# Patient Record
Sex: FEMALE | Race: BLACK OR AFRICAN AMERICAN | NOT HISPANIC OR LATINO | URBAN - NONMETROPOLITAN AREA
[De-identification: names, ages, dates, MRNs, and addresses within clinical notes are randomized per-mention and may not be internally consistent; named-entity substitution may affect disease eponyms.]

---

## 2018-02-26 PROBLEM — H16.223: Noted: 2018-02-26

## 2018-02-26 PROBLEM — E11.9: Noted: 2018-02-26

## 2018-02-26 PROBLEM — H40.1231: Noted: 2019-11-25

## 2018-02-26 PROBLEM — H25.813: Noted: 2018-02-26

## 2019-04-10 ENCOUNTER — IMPORTED ENCOUNTER (OUTPATIENT)
Dept: URBAN - NONMETROPOLITAN AREA CLINIC 1 | Facility: CLINIC | Age: 64
End: 2019-04-10

## 2019-04-10 PROBLEM — H25.813: Noted: 2018-02-26

## 2019-04-10 PROBLEM — E11.9: Noted: 2018-02-26

## 2019-04-10 PROBLEM — H40.233: Noted: 2019-04-10

## 2019-04-10 PROBLEM — H16.223: Noted: 2018-02-26

## 2019-04-10 PROCEDURE — 99213 OFFICE O/P EST LOW 20 MIN: CPT

## 2019-04-10 PROCEDURE — 92020 GONIOSCOPY: CPT

## 2019-04-10 NOTE — PATIENT DISCUSSION
Intermittent Angle Closure Glaucoma -  Discussed findings of exam in detail with the patient. -  Discussed the risk of permanent vision loss and blindness associated with glaucoma. -  Discussed the need for lifelong monitoring to detect signs of glaucoma.-Discussed the chronic progressive nature of this disease and various treatment options.-Importance of good compliance with medications was emphasized. - IOP's today were 10 OU.- Cont. Combigan OU BID.- Cont. Latanoprost OU QHS. -Continue current gtt regimen for now. - S/P repeat SLT OD 1/5/17.-Considering synechial angle closure I would speculate angle closure mechanism rather than LTG. Will obtain VF and discuss/recommend LPI OU and lens removal over the next few visits as patients gains understanding of her glaucoma mechanism. -Order HVF 24-2 next visitRTC: 4 mos f/u & HVF 24-2DM s -Stressed the importance of keeping blood sugars under control blood pressure under control and weight normalization and regular visits with PCP. -Explained the possible effects of poorly controlled diabetes and the damage that diabetes can cause to ocular health. -Patient to check HgbA1C.-Pt instructed to contact our office with any vision changes. KEVON-  Discussed findings of exam in detail with the patient. -  Discussed the chronic nature and treatment options with the patient. -  Continue Systane Ultra QID/prn. Recommend using more frequently and on a regular basis. Cataract OU-Reviewed symptoms of advancing cataract growth such as glare and halos and decreased vision.-Continue to monitor for now.  Pt will notify us if any new symptoms develop.; Dr's Notes: VF- 8/13/18[facility] OCT - 2/28/18

## 2019-10-23 ENCOUNTER — IMPORTED ENCOUNTER (OUTPATIENT)
Dept: URBAN - NONMETROPOLITAN AREA CLINIC 1 | Facility: CLINIC | Age: 64
End: 2019-10-23

## 2019-10-23 PROCEDURE — 92083 EXTENDED VISUAL FIELD XM: CPT

## 2019-11-25 ENCOUNTER — IMPORTED ENCOUNTER (OUTPATIENT)
Dept: URBAN - NONMETROPOLITAN AREA CLINIC 1 | Facility: CLINIC | Age: 64
End: 2019-11-25

## 2019-11-25 PROCEDURE — 92012 INTRM OPH EXAM EST PATIENT: CPT

## 2020-06-15 ENCOUNTER — IMPORTED ENCOUNTER (OUTPATIENT)
Dept: URBAN - NONMETROPOLITAN AREA CLINIC 1 | Facility: CLINIC | Age: 65
End: 2020-06-15

## 2020-06-15 PROCEDURE — 92014 COMPRE OPH EXAM EST PT 1/>: CPT

## 2020-06-15 PROCEDURE — 92020 GONIOSCOPY: CPT

## 2020-06-15 NOTE — PATIENT DISCUSSION
LOW-TENSION GLAUCOMA- IOP's today stable ou- Cont. Combigan OU BID.- Cont. Latanoprost OU QHS.- S/P repeat SLT OD-monitorDM s DR-Stressed the importance of keeping blood sugars under control blood pressure under control and weight normalization and regular visits with PCP. -Explained the possible effects of poorly controlled diabetes and the damage that diabetes can cause to ocular health. -Patient to check HgbA1C.-Pt instructed to contact our office with any vision changes. KEVON-  Discussed findings of exam in detail with the patient. -  Discussed the chronic nature and treatment options with the patient. -  Continue Systane Ultra QID/prn. Recommend using more frequently and on a regular basis. Cataract OU-Reviewed symptoms of advancing cataract growth such as glare and halos and decreased vision.-Continue to monitor for now.  Pt will notify us if any new symptoms develop.; Dr's Notes: VF- 8/13/18[facility] OCT - 2/28/18

## 2022-04-09 ASSESSMENT — PACHYMETRY
OS_CT_UM: 490; ADJ: VTHIN
OS_CT_UM: 490; ADJ: VTHIN
OD_CT_UM: 473; ADJ: VTHIN
OS_CT_UM: 490; ADJ: VTHIN
OD_CT_UM: 473; ADJ: VTHIN
OD_CT_UM: 473; ADJ: VTHIN

## 2022-04-09 ASSESSMENT — TONOMETRY
OS_IOP_MMHG: 10
OD_IOP_MMHG: 10
OS_IOP_MMHG: 10
OS_IOP_MMHG: 12
OD_IOP_MMHG: 10
OD_IOP_MMHG: 12

## 2022-04-09 ASSESSMENT — VISUAL ACUITY
OS_SC: 20/20
OD_SC: 20/20
OS_CC: J2
OD_SC: 20/20
OS_SC: 20/20
OD_CC: J2
OD_SC: 20/20
OS_SC: 20/20